# Patient Record
Sex: FEMALE | Race: WHITE | NOT HISPANIC OR LATINO | URBAN - METROPOLITAN AREA
[De-identification: names, ages, dates, MRNs, and addresses within clinical notes are randomized per-mention and may not be internally consistent; named-entity substitution may affect disease eponyms.]

---

## 2020-07-25 ENCOUNTER — TELEPHONE ENCOUNTER (OUTPATIENT)
Dept: URBAN - METROPOLITAN AREA CLINIC 13 | Facility: CLINIC | Age: 50
End: 2020-07-25

## 2020-07-25 RX ORDER — THYROID, PORCINE 90 MG/1
TAKE 1 TABLET DAILY TABLET ORAL
Refills: 0 | OUTPATIENT
End: 2015-08-19

## 2020-07-25 RX ORDER — LUBIPROSTONE 8 UG/1
TAKE 3 CAPSULE TWICE DAILY CAPSULE, GELATIN COATED ORAL
Refills: 0 | OUTPATIENT
Start: 2009-07-17 | End: 2009-08-11

## 2020-07-26 ENCOUNTER — TELEPHONE ENCOUNTER (OUTPATIENT)
Dept: URBAN - METROPOLITAN AREA CLINIC 13 | Facility: CLINIC | Age: 50
End: 2020-07-26

## 2020-07-26 RX ORDER — LEVOFLOXACIN 500 MG/1
TABLET, FILM COATED ORAL
Qty: 10 | Refills: 0 | Status: ACTIVE | COMMUNITY
Start: 2014-01-16

## 2020-07-26 RX ORDER — VALACYCLOVIR 1 G/1
TAKE 1 TABLET DAILY TABLET, FILM COATED ORAL
Refills: 0 | Status: ACTIVE | COMMUNITY
Start: 2016-04-12

## 2020-07-26 RX ORDER — IBUPROFEN 800 MG/1
TABLET ORAL
Qty: 30 | Refills: 0 | Status: ACTIVE | COMMUNITY
Start: 2016-09-20

## 2020-07-26 RX ORDER — AZITHROMYCIN DIHYDRATE 250 MG/1
TABLET, FILM COATED ORAL
Qty: 6 | Refills: 0 | Status: ACTIVE | COMMUNITY
Start: 2016-02-21

## 2020-07-26 RX ORDER — LEVOTHYROXINE SODIUM 25 UG/1
TABLET ORAL
Qty: 30 | Refills: 0 | Status: ACTIVE | COMMUNITY
Start: 2016-08-08

## 2020-07-26 RX ORDER — MODAFINIL 200 MG/1
TAKE 1 TABLET DAILY TABLET ORAL
Refills: 0 | Status: ACTIVE | COMMUNITY
Start: 2016-02-15

## 2020-07-26 RX ORDER — AMOXICILLIN 500 MG/1
TAKE 2 CAPSULES TWICE DAILY CAPSULE ORAL
Qty: 56 | Refills: 0 | Status: ACTIVE | COMMUNITY
Start: 2016-04-19

## 2020-07-26 RX ORDER — NITROFURANTOIN MONOHYDRATE/MACROCRYSTALLINE 25; 75 MG/1; MG/1
CAPSULE ORAL
Qty: 14 | Refills: 0 | Status: ACTIVE | COMMUNITY
Start: 2016-09-15

## 2020-07-26 RX ORDER — CLARITHROMYCIN 500 MG/1
TAKE 1 TABLET TWICE DAILY TABLET, FILM COATED ORAL
Qty: 10 | Refills: 0 | Status: ACTIVE | COMMUNITY
Start: 2016-04-19

## 2020-07-26 RX ORDER — LEVOTHYROXINE SODIUM 0.05 MG/1
TAKE 1 TABLET DAILY TABLET ORAL
Refills: 0 | Status: ACTIVE | COMMUNITY
Start: 2016-02-16

## 2020-07-26 RX ORDER — DIAZEPAM 5 MG/1
TABLET ORAL
Qty: 30 | Refills: 0 | Status: ACTIVE | COMMUNITY
Start: 2016-01-14

## 2020-07-26 RX ORDER — SERTRALINE 50 MG/1
TAKE 1 TABLET DAILY TABLET, FILM COATED ORAL
Qty: 30 | Refills: 1 | Status: ACTIVE | COMMUNITY
Start: 2016-04-18

## 2020-07-26 RX ORDER — DEXLANSOPRAZOLE 60 MG/1
TAKE 1 CAPSULE BY MOUTH EVERY DAY CAPSULE, DELAYED RELEASE ORAL
Qty: 90 | Refills: 3 | Status: ACTIVE | COMMUNITY
Start: 2016-04-12

## 2020-07-26 RX ORDER — ATORVASTATIN CALCIUM 40 MG/1
TABLET, FILM COATED ORAL
Qty: 30 | Refills: 0 | Status: ACTIVE | COMMUNITY
Start: 2016-09-01

## 2020-07-26 RX ORDER — METOCLOPRAMIDE 5 MG/1
TAKE 1 TABLET 3 TIMES DAILY PRN AS NEEDED TABLET ORAL
Qty: 60 | Refills: 1 | Status: ACTIVE | COMMUNITY
Start: 2016-05-26

## 2020-07-26 RX ORDER — GLATIRAMER ACETATE 20 MG/ML
INJECT 20 MG DAILY INJECTION, SOLUTION SUBCUTANEOUS
Refills: 0 | Status: ACTIVE | COMMUNITY
Start: 2016-03-16

## 2020-07-26 RX ORDER — LORATADINE 5 MG/5 ML
TAKE 1 TABLET DAILY AS DIRECTED SOLUTION, ORAL ORAL
Refills: 0 | Status: ACTIVE | COMMUNITY

## 2020-07-26 RX ORDER — IBUPROFEN 800 MG/1
TABLET ORAL
Qty: 30 | Refills: 0 | Status: ACTIVE | COMMUNITY
Start: 2016-04-04

## 2020-07-26 RX ORDER — LINACLOTIDE 145 UG/1
TAKE 1 CAPSULE BY MOUTH DAILY CAPSULE, GELATIN COATED ORAL
Qty: 90 | Refills: 3 | Status: ACTIVE | COMMUNITY
Start: 2016-07-12

## 2020-07-26 RX ORDER — AZITHROMYCIN DIHYDRATE 250 MG/1
TABLET, FILM COATED ORAL
Qty: 6 | Refills: 0 | Status: ACTIVE | COMMUNITY
Start: 2014-04-11

## 2020-07-26 RX ORDER — DEXLANSOPRAZOLE 60 MG/1
TAKE ONE CAPSULE EVERY MORNING BEFORE BREAKFAST CAPSULE, DELAYED RELEASE ORAL
Qty: 20 | Refills: 0 | Status: ACTIVE | COMMUNITY
Start: 2017-04-12

## 2020-07-26 RX ORDER — LEVOTHYROXINE SODIUM 25 UG/1
TABLET ORAL
Qty: 30 | Refills: 0 | Status: ACTIVE | COMMUNITY
Start: 2016-06-30

## 2023-02-08 ENCOUNTER — OFFICE VISIT (OUTPATIENT)
Dept: URBAN - METROPOLITAN AREA CLINIC 113 | Facility: CLINIC | Age: 53
End: 2023-02-08

## 2023-03-15 ENCOUNTER — OFFICE VISIT (OUTPATIENT)
Dept: URBAN - METROPOLITAN AREA CLINIC 113 | Facility: CLINIC | Age: 53
End: 2023-03-15

## 2024-07-02 ENCOUNTER — OFFICE VISIT (OUTPATIENT)
Dept: URBAN - METROPOLITAN AREA CLINIC 107 | Facility: CLINIC | Age: 54
End: 2024-07-02

## 2024-07-02 RX ORDER — LEVOTHYROXINE SODIUM 0.05 MG/1
TAKE 1 TABLET DAILY TABLET ORAL
Refills: 0 | Status: ACTIVE | COMMUNITY
Start: 2016-02-16

## 2024-07-02 RX ORDER — IBUPROFEN 800 MG/1
TABLET ORAL
Qty: 30 | Refills: 0 | Status: ACTIVE | COMMUNITY
Start: 2016-04-04

## 2024-07-02 RX ORDER — SERTRALINE 50 MG/1
TAKE 1 TABLET DAILY TABLET, FILM COATED ORAL
Qty: 30 | Refills: 1 | Status: ACTIVE | COMMUNITY
Start: 2016-04-18

## 2024-07-02 RX ORDER — LINACLOTIDE 145 UG/1
TAKE 1 CAPSULE BY MOUTH DAILY CAPSULE, GELATIN COATED ORAL
Qty: 90 | Refills: 3 | Status: ACTIVE | COMMUNITY
Start: 2016-07-12

## 2024-07-02 RX ORDER — LORATADINE 5 MG/5 ML
TAKE 1 TABLET DAILY AS DIRECTED SOLUTION, ORAL ORAL
Refills: 0 | Status: ACTIVE | COMMUNITY

## 2024-07-02 RX ORDER — DEXLANSOPRAZOLE 60 MG/1
TAKE 1 CAPSULE BY MOUTH EVERY DAY CAPSULE, DELAYED RELEASE ORAL
Qty: 90 | Refills: 3 | Status: ACTIVE | COMMUNITY
Start: 2016-04-12

## 2024-07-02 RX ORDER — AMOXICILLIN 500 MG/1
TAKE 2 CAPSULES TWICE DAILY CAPSULE ORAL
Qty: 56 | Refills: 0 | Status: ACTIVE | COMMUNITY
Start: 2016-04-19

## 2024-07-02 RX ORDER — VALACYCLOVIR 1 G/1
TAKE 1 TABLET DAILY TABLET, FILM COATED ORAL
Refills: 0 | Status: ACTIVE | COMMUNITY
Start: 2016-04-12

## 2024-07-02 RX ORDER — LEVOFLOXACIN 500 MG/1
TABLET, FILM COATED ORAL
Qty: 10 | Refills: 0 | Status: ACTIVE | COMMUNITY
Start: 2014-01-16

## 2024-07-02 RX ORDER — METOCLOPRAMIDE 5 MG/1
TAKE 1 TABLET 3 TIMES DAILY PRN AS NEEDED TABLET ORAL
Qty: 60 | Refills: 1 | Status: ACTIVE | COMMUNITY
Start: 2016-05-26

## 2024-07-02 RX ORDER — LEVOTHYROXINE SODIUM 25 UG/1
TABLET ORAL
Qty: 30 | Refills: 0 | Status: ACTIVE | COMMUNITY
Start: 2016-06-30

## 2024-07-02 RX ORDER — CLARITHROMYCIN 500 MG/1
TAKE 1 TABLET TWICE DAILY TABLET, FILM COATED ORAL
Qty: 10 | Refills: 0 | Status: ACTIVE | COMMUNITY
Start: 2016-04-19

## 2024-07-02 RX ORDER — ATORVASTATIN CALCIUM 40 MG/1
TABLET, FILM COATED ORAL
Qty: 30 | Refills: 0 | Status: ACTIVE | COMMUNITY
Start: 2016-09-01

## 2024-07-02 RX ORDER — MODAFINIL 200 MG/1
TAKE 1 TABLET DAILY TABLET ORAL
Refills: 0 | Status: ACTIVE | COMMUNITY
Start: 2016-02-15

## 2024-07-02 RX ORDER — NITROFURANTOIN MONOHYDRATE/MACROCRYSTALLINE 25; 75 MG/1; MG/1
CAPSULE ORAL
Qty: 14 | Refills: 0 | Status: ACTIVE | COMMUNITY
Start: 2016-09-15

## 2024-07-02 RX ORDER — GLATIRAMER ACETATE 20 MG/ML
INJECT 20 MG DAILY INJECTION, SOLUTION SUBCUTANEOUS
Refills: 0 | Status: ACTIVE | COMMUNITY
Start: 2016-03-16

## 2024-07-02 RX ORDER — DIAZEPAM 5 MG/1
TABLET ORAL
Qty: 30 | Refills: 0 | Status: ACTIVE | COMMUNITY
Start: 2016-01-14

## 2024-07-02 RX ORDER — AZITHROMYCIN DIHYDRATE 250 MG/1
TABLET, FILM COATED ORAL
Qty: 6 | Refills: 0 | Status: ACTIVE | COMMUNITY
Start: 2014-04-11

## 2024-07-08 ENCOUNTER — DASHBOARD ENCOUNTERS (OUTPATIENT)
Age: 54
End: 2024-07-08

## 2024-07-08 ENCOUNTER — OFFICE VISIT (OUTPATIENT)
Dept: URBAN - METROPOLITAN AREA CLINIC 107 | Facility: CLINIC | Age: 54
End: 2024-07-08
Payer: COMMERCIAL

## 2024-07-08 ENCOUNTER — LAB OUTSIDE AN ENCOUNTER (OUTPATIENT)
Dept: URBAN - METROPOLITAN AREA CLINIC 107 | Facility: CLINIC | Age: 54
End: 2024-07-08

## 2024-07-08 VITALS
OXYGEN SATURATION: 98 % | RESPIRATION RATE: 18 BRPM | DIASTOLIC BLOOD PRESSURE: 93 MMHG | HEIGHT: 66 IN | SYSTOLIC BLOOD PRESSURE: 139 MMHG | TEMPERATURE: 98.2 F | WEIGHT: 181.4 LBS | HEART RATE: 93 BPM | BODY MASS INDEX: 29.15 KG/M2

## 2024-07-08 DIAGNOSIS — K59.09 OTHER CONSTIPATION: ICD-10-CM

## 2024-07-08 DIAGNOSIS — R14.0 ABDOMINAL BLOATING: ICD-10-CM

## 2024-07-08 DIAGNOSIS — R11.0 CHRONIC NAUSEA: ICD-10-CM

## 2024-07-08 DIAGNOSIS — R63.0 DECREASED APPETITE: ICD-10-CM

## 2024-07-08 DIAGNOSIS — R10.84 GENERALIZED ABDOMINAL PAIN: ICD-10-CM

## 2024-07-08 PROBLEM — 64379006: Status: ACTIVE | Noted: 2024-07-08

## 2024-07-08 PROCEDURE — 99204 OFFICE O/P NEW MOD 45 MIN: CPT | Performed by: NURSE PRACTITIONER

## 2024-07-08 RX ORDER — VALACYCLOVIR 1 G/1
TAKE 1 TABLET DAILY TABLET, FILM COATED ORAL
Refills: 0 | Status: ON HOLD | COMMUNITY
Start: 2016-04-12

## 2024-07-08 RX ORDER — POLYETHYLENE GLYCOL 3350, SODIUM CHLORIDE, SODIUM BICARBONATE, POTASSIUM CHLORIDE 420; 11.2; 5.72; 1.48 G/4L; G/4L; G/4L; G/4L
4000 ML POWDER, FOR SOLUTION ORAL ONCE
Qty: 4000 ML | Refills: 0 | OUTPATIENT
Start: 2024-07-08 | End: 2024-07-09

## 2024-07-08 RX ORDER — LEVOFLOXACIN 500 MG/1
TABLET, FILM COATED ORAL
Qty: 10 | Refills: 0 | Status: ON HOLD | COMMUNITY
Start: 2014-01-16

## 2024-07-08 RX ORDER — SERTRALINE 50 MG/1
TAKE 1 TABLET DAILY TABLET, FILM COATED ORAL
Qty: 30 | Refills: 1 | Status: ON HOLD | COMMUNITY
Start: 2016-04-18

## 2024-07-08 RX ORDER — LISINOPRIL 5 MG/1
1 TABLET TABLET ORAL ONCE A DAY
Status: ACTIVE | COMMUNITY

## 2024-07-08 RX ORDER — LORATADINE 5 MG/5 ML
TAKE 1 TABLET DAILY AS DIRECTED SOLUTION, ORAL ORAL
Refills: 0 | Status: ON HOLD | COMMUNITY

## 2024-07-08 RX ORDER — AMLODIPINE BESYLATE 5 MG/1
1 TABLET TABLET ORAL ONCE A DAY
Status: ACTIVE | COMMUNITY

## 2024-07-08 RX ORDER — CLARITHROMYCIN 500 MG/1
TAKE 1 TABLET TWICE DAILY TABLET, FILM COATED ORAL
Qty: 10 | Refills: 0 | Status: ON HOLD | COMMUNITY
Start: 2016-04-19

## 2024-07-08 RX ORDER — DEXLANSOPRAZOLE 60 MG/1
TAKE 1 CAPSULE BY MOUTH EVERY DAY CAPSULE, DELAYED RELEASE ORAL
Qty: 90 | Refills: 3 | Status: ON HOLD | COMMUNITY
Start: 2016-04-12

## 2024-07-08 RX ORDER — DIAZEPAM 5 MG/1
TABLET ORAL
Qty: 30 | Refills: 0 | Status: ON HOLD | COMMUNITY
Start: 2016-01-14

## 2024-07-08 RX ORDER — LINACLOTIDE 145 UG/1
TAKE 1 CAPSULE BY MOUTH DAILY CAPSULE, GELATIN COATED ORAL
Qty: 90 | Refills: 3 | Status: ACTIVE | COMMUNITY
Start: 2016-07-12

## 2024-07-08 RX ORDER — NITROFURANTOIN MONOHYDRATE/MACROCRYSTALLINE 25; 75 MG/1; MG/1
CAPSULE ORAL
Qty: 14 | Refills: 0 | Status: ON HOLD | COMMUNITY
Start: 2016-09-15

## 2024-07-08 RX ORDER — AMOXICILLIN 500 MG/1
TAKE 2 CAPSULES TWICE DAILY CAPSULE ORAL
Qty: 56 | Refills: 0 | Status: ON HOLD | COMMUNITY
Start: 2016-04-19

## 2024-07-08 RX ORDER — ATORVASTATIN CALCIUM 40 MG/1
TABLET, FILM COATED ORAL
Qty: 30 | Refills: 0 | Status: ON HOLD | COMMUNITY
Start: 2016-09-01

## 2024-07-08 RX ORDER — IBUPROFEN 800 MG/1
TABLET ORAL
Qty: 30 | Refills: 0 | Status: ON HOLD | COMMUNITY
Start: 2016-04-04

## 2024-07-08 RX ORDER — OMEPRAZOLE 20 MG/1
1 TABLET 30 MINUTES BEFORE MORNING MEAL TABLET, DELAYED RELEASE ORAL ONCE A DAY
Status: ACTIVE | COMMUNITY

## 2024-07-08 RX ORDER — LEVOTHYROXINE SODIUM 0.05 MG/1
TAKE 1 TABLET DAILY TABLET ORAL
Refills: 0 | Status: ON HOLD | COMMUNITY
Start: 2016-02-16

## 2024-07-08 RX ORDER — GLATIRAMER ACETATE 20 MG/ML
INJECT 20 MG DAILY INJECTION, SOLUTION SUBCUTANEOUS
Refills: 0 | Status: ON HOLD | COMMUNITY
Start: 2016-03-16

## 2024-07-08 RX ORDER — LINACLOTIDE 290 UG/1
1 CAPSULE AT LEAST 30 MINUTES BEFORE THE FIRST MEAL OF THE DAY ON AN EMPTY STOMACH CAPSULE, GELATIN COATED ORAL ONCE A DAY
Qty: 90 | Refills: 3 | OUTPATIENT
Start: 2024-07-08 | End: 2025-07-03

## 2024-07-08 RX ORDER — MODAFINIL 200 MG/1
TAKE 1 TABLET DAILY TABLET ORAL
Refills: 0 | Status: ON HOLD | COMMUNITY
Start: 2016-02-15

## 2024-07-08 RX ORDER — LEVOTHYROXINE SODIUM 25 UG/1
TABLET ORAL
Qty: 30 | Refills: 0 | Status: ON HOLD | COMMUNITY
Start: 2016-06-30

## 2024-07-08 RX ORDER — METOCLOPRAMIDE 5 MG/1
TAKE 1 TABLET 3 TIMES DAILY PRN AS NEEDED TABLET ORAL
Qty: 60 | Refills: 1 | Status: ON HOLD | COMMUNITY
Start: 2016-05-26

## 2024-07-08 RX ORDER — AZITHROMYCIN DIHYDRATE 250 MG/1
TABLET, FILM COATED ORAL
Qty: 6 | Refills: 0 | Status: ON HOLD | COMMUNITY
Start: 2014-04-11

## 2024-07-08 NOTE — HPI-TODAY'S VISIT:
53-year-old woman presenting for evaluation of abdominal pain, bloating and constipation.    Recent notes from Dr. Nathan Small are reviewed.  She has past medical history of hypothyroidism, autoimmune thyroiditis, vitamin D deficiency, mixed hyperlipidemia, generalized anxiety disorder, nicotine dependence, chronic depression, multiple sclerosis, migraines, chronic constipation. She is on Linzess 145 mcg daily.  She presents today with abdominal pain in the generlized abdomen, with back and hip pain. She has significant constipation. She estimates having bowel movements once every 5 to 7 days. Stools are small in volume, and pencil thin. She is having frequent candidal infections. She has been on Diflucan and magic mouthwash for a month. She has significant bloating. She has been seen in the ED multiple times. She brings with her some records from [place], dated June 4, 2024. She had normal labs, She had a CT scan showing a mild ileus pattern or gastrointestinal stasis. There is hepatomegaly, hepatic steatosis, and a 17 mm right lobe hepatic hemangioma. She has significant bloating. She has significant abdominal pain. Her oral intake is decreaing. There is heartburn, worsening over the last few days. She has chronic nausea. She does have some regurgitation at times. She is taking omeptazole 20 mg daily. There is no trouble with swallowing.   She also has MS. She did have a normal gastric emptying study several years ago. She has been lost to follow up since that time. She has followed with [facility], and reports a colonoscopy within the last year.

## 2024-07-08 NOTE — PHYSICAL EXAM NECK/THYROID:
normal appearance Instructions (Optional): Referred to Dr. Waggoner for additional treatment options Size Of Lesion In Cm (Optional): 0 Introduction Text (Please End With A Colon): The following procedure was deferred: Detail Level: Zone

## 2024-07-15 ENCOUNTER — TELEPHONE ENCOUNTER (OUTPATIENT)
Dept: URBAN - METROPOLITAN AREA CLINIC 107 | Facility: CLINIC | Age: 54
End: 2024-07-15

## 2024-07-16 ENCOUNTER — LAB OUTSIDE AN ENCOUNTER (OUTPATIENT)
Dept: URBAN - METROPOLITAN AREA CLINIC 113 | Facility: CLINIC | Age: 54
End: 2024-07-16

## 2024-07-16 ENCOUNTER — TELEPHONE ENCOUNTER (OUTPATIENT)
Dept: URBAN - METROPOLITAN AREA CLINIC 113 | Facility: CLINIC | Age: 54
End: 2024-07-16

## 2024-07-16 PROBLEM — 300331000: Status: ACTIVE | Noted: 2024-07-16

## 2024-07-19 ENCOUNTER — TELEPHONE ENCOUNTER (OUTPATIENT)
Dept: URBAN - METROPOLITAN AREA CLINIC 113 | Facility: CLINIC | Age: 54
End: 2024-07-19

## 2024-07-20 LAB
A/G RATIO: 2
ALBUMIN: 4.7
ALKALINE PHOSPHATASE: 90
ALT (SGPT): 159
AST (SGOT): 98
BILIRUBIN, TOTAL: 0.6
BUN/CREATININE RATIO: 4
BUN: 3
CALCIUM: 9.9
CARBON DIOXIDE, TOTAL: 25
CHLORIDE: 103
CREATININE: 0.67
EGFR: 105
GLOBULIN, TOTAL: 2.4
GLUCOSE: 85
HEMATOCRIT: 44.8
HEMOGLOBIN: 15.7
MCH: 31.3
MCHC: 35
MCV: 89.4
MPV: 11
PLATELET COUNT: 268
POTASSIUM: 4.2
PROTEIN, TOTAL: 7.1
RDW: 13.2
RED BLOOD CELL COUNT: 5.01
SODIUM: 137
WHITE BLOOD CELL COUNT: 10.1

## 2024-08-07 ENCOUNTER — OFFICE VISIT (OUTPATIENT)
Dept: URBAN - METROPOLITAN AREA CLINIC 113 | Facility: CLINIC | Age: 54
End: 2024-08-07

## 2024-08-08 ENCOUNTER — LAB OUTSIDE AN ENCOUNTER (OUTPATIENT)
Dept: URBAN - METROPOLITAN AREA CLINIC 113 | Facility: CLINIC | Age: 54
End: 2024-08-08

## 2024-08-08 ENCOUNTER — OFFICE VISIT (OUTPATIENT)
Dept: URBAN - METROPOLITAN AREA CLINIC 113 | Facility: CLINIC | Age: 54
End: 2024-08-08
Payer: COMMERCIAL

## 2024-08-08 VITALS
BODY MASS INDEX: 28.19 KG/M2 | HEIGHT: 66 IN | SYSTOLIC BLOOD PRESSURE: 117 MMHG | HEART RATE: 108 BPM | TEMPERATURE: 97.3 F | RESPIRATION RATE: 20 BRPM | WEIGHT: 175.4 LBS | DIASTOLIC BLOOD PRESSURE: 93 MMHG

## 2024-08-08 DIAGNOSIS — R79.89 ELEVATED LFTS: ICD-10-CM

## 2024-08-08 DIAGNOSIS — K76.9 LIVER LESION: ICD-10-CM

## 2024-08-08 DIAGNOSIS — R14.0 ABDOMINAL BLOATING: ICD-10-CM

## 2024-08-08 DIAGNOSIS — K59.09 OTHER CONSTIPATION: ICD-10-CM

## 2024-08-08 PROCEDURE — 99214 OFFICE O/P EST MOD 30 MIN: CPT | Performed by: NURSE PRACTITIONER

## 2024-08-08 RX ORDER — LINACLOTIDE 145 UG/1
TAKE 1 CAPSULE BY MOUTH DAILY CAPSULE, GELATIN COATED ORAL
Qty: 90 | Refills: 3 | Status: ON HOLD | COMMUNITY
Start: 2016-07-12

## 2024-08-08 RX ORDER — VALACYCLOVIR 1 G/1
TAKE 1 TABLET DAILY TABLET, FILM COATED ORAL
Refills: 0 | Status: ON HOLD | COMMUNITY
Start: 2016-04-12

## 2024-08-08 RX ORDER — IBUPROFEN 800 MG/1
TABLET ORAL
Qty: 30 | Refills: 0 | Status: ON HOLD | COMMUNITY
Start: 2016-04-04

## 2024-08-08 RX ORDER — AZITHROMYCIN DIHYDRATE 250 MG/1
TABLET, FILM COATED ORAL
Qty: 6 | Refills: 0 | Status: ON HOLD | COMMUNITY
Start: 2014-04-11

## 2024-08-08 RX ORDER — SERTRALINE 50 MG/1
TAKE 1 TABLET DAILY TABLET, FILM COATED ORAL
Qty: 30 | Refills: 1 | Status: ON HOLD | COMMUNITY
Start: 2016-04-18

## 2024-08-08 RX ORDER — METOCLOPRAMIDE 5 MG/1
TAKE 1 TABLET 3 TIMES DAILY PRN AS NEEDED TABLET ORAL
Qty: 60 | Refills: 1 | Status: ON HOLD | COMMUNITY
Start: 2016-05-26

## 2024-08-08 RX ORDER — AMLODIPINE BESYLATE 5 MG/1
1 TABLET TABLET ORAL ONCE A DAY
Status: ACTIVE | COMMUNITY

## 2024-08-08 RX ORDER — LISINOPRIL 5 MG/1
1 TABLET TABLET ORAL ONCE A DAY
Status: ACTIVE | COMMUNITY

## 2024-08-08 RX ORDER — LINACLOTIDE 290 UG/1
1 CAPSULE AT LEAST 30 MINUTES BEFORE THE FIRST MEAL OF THE DAY ON AN EMPTY STOMACH CAPSULE, GELATIN COATED ORAL ONCE A DAY
Qty: 90 | Refills: 3 | Status: ACTIVE | COMMUNITY
Start: 2024-07-08 | End: 2025-07-03

## 2024-08-08 RX ORDER — CLARITHROMYCIN 500 MG/1
TAKE 1 TABLET TWICE DAILY TABLET, FILM COATED ORAL
Qty: 10 | Refills: 0 | Status: ON HOLD | COMMUNITY
Start: 2016-04-19

## 2024-08-08 RX ORDER — DEXLANSOPRAZOLE 60 MG/1
TAKE 1 CAPSULE BY MOUTH EVERY DAY CAPSULE, DELAYED RELEASE ORAL
Qty: 90 | Refills: 3 | Status: ON HOLD | COMMUNITY
Start: 2016-04-12

## 2024-08-08 RX ORDER — NITROFURANTOIN MONOHYDRATE/MACROCRYSTALLINE 25; 75 MG/1; MG/1
CAPSULE ORAL
Qty: 14 | Refills: 0 | Status: ON HOLD | COMMUNITY
Start: 2016-09-15

## 2024-08-08 RX ORDER — LINACLOTIDE 290 UG/1
1 CAPSULE AT LEAST 30 MINUTES BEFORE THE FIRST MEAL OF THE DAY ON AN EMPTY STOMACH CAPSULE, GELATIN COATED ORAL ONCE A DAY
OUTPATIENT
Start: 2024-07-08

## 2024-08-08 RX ORDER — MODAFINIL 200 MG/1
TAKE 1 TABLET DAILY TABLET ORAL
Refills: 0 | Status: ON HOLD | COMMUNITY
Start: 2016-02-15

## 2024-08-08 RX ORDER — OMEPRAZOLE 20 MG/1
1 TABLET 30 MINUTES BEFORE MORNING MEAL TABLET, DELAYED RELEASE ORAL ONCE A DAY
Status: ACTIVE | COMMUNITY

## 2024-08-08 RX ORDER — LEVOFLOXACIN 500 MG/1
TABLET, FILM COATED ORAL
Qty: 10 | Refills: 0 | Status: ON HOLD | COMMUNITY
Start: 2014-01-16

## 2024-08-08 RX ORDER — LEVOTHYROXINE SODIUM 25 UG/1
TABLET ORAL
Qty: 30 | Refills: 0 | Status: ON HOLD | COMMUNITY
Start: 2016-06-30

## 2024-08-08 RX ORDER — LEVOTHYROXINE SODIUM 0.05 MG/1
TAKE 1 TABLET DAILY TABLET ORAL
Refills: 0 | Status: ON HOLD | COMMUNITY
Start: 2016-02-16

## 2024-08-08 RX ORDER — GLATIRAMER ACETATE 20 MG/ML
INJECT 20 MG DAILY INJECTION, SOLUTION SUBCUTANEOUS
Refills: 0 | Status: ON HOLD | COMMUNITY
Start: 2016-03-16

## 2024-08-08 RX ORDER — AMOXICILLIN 500 MG/1
TAKE 2 CAPSULES TWICE DAILY CAPSULE ORAL
Qty: 56 | Refills: 0 | Status: ON HOLD | COMMUNITY
Start: 2016-04-19

## 2024-08-08 RX ORDER — ATORVASTATIN CALCIUM 40 MG/1
TABLET, FILM COATED ORAL
Qty: 30 | Refills: 0 | Status: ON HOLD | COMMUNITY
Start: 2016-09-01

## 2024-08-08 RX ORDER — DIAZEPAM 5 MG/1
TABLET ORAL
Qty: 30 | Refills: 0 | Status: ON HOLD | COMMUNITY
Start: 2016-01-14

## 2024-08-08 RX ORDER — LORATADINE 5 MG/5 ML
TAKE 1 TABLET DAILY AS DIRECTED SOLUTION, ORAL ORAL
Refills: 0 | Status: ON HOLD | COMMUNITY

## 2024-08-08 NOTE — HPI-TODAY'S VISIT:
53-year-old woman presenting for evaluation of abdominal pain, bloating and constipation.    Recent notes from Dr. Nahtan Small are reviewed.  She has past medical history of hypothyroidism, autoimmune thyroiditis, vitamin D deficiency, mixed hyperlipidemia, generalized anxiety disorder, nicotine dependence, chronic depression, multiple sclerosis, migraines, chronic constipation. She is on Linzess 145 mcg daily.  She presents today with abdominal pain in the generlized abdomen, with back and hip pain. She has significant constipation. She estimates having bowel movements once every 5 to 7 days. Stools are small in volume, and pencil thin. She is having frequent candidal infections. She has been on Diflucan and magic mouthwash for a month. She has significant bloating. She has been seen in the ED multiple times. She brings with her some records from [place], dated June 4, 2024. She had normal labs, She had a CT scan showing a mild ileus pattern or gastrointestinal stasis. There is hepatomegaly, hepatic steatosis, and a 17 mm right lobe hepatic hemangioma. She has significant bloating. She has significant abdominal pain. Her oral intake is decreaing. There is heartburn, worsening over the last few days. She has chronic nausea. She does have some regurgitation at times. She is taking omeptazole 20 mg daily. There is no trouble with swallowing.   She also has MS. She did have a normal gastric emptying study several years ago. She has been lost to follow up since that time. She has followed with [facility], and reports a colonoscopy within the last year.  CT a/p 7/12/24: No acute intra-abdominal pathology. Marked hepatic steatosis with a 17 mm indeterminate enhancing lesions within the right posterior right hepatic lobe. A dedicated MRI liver protocol with and without contrast was recommended.  Labs 7/8/24: BUN 3, Crt 0.67, Na 137, K 4.2, Tbili 0.6, ALP 90, AST 98, . WBC 10.1, Hg 15.7, MCV 89.4, Plt 268.  She is scheduled for the MRI test tomorrow, which is planned to evaluate the 17 mm lesion. She continues with constipation predominant bowel habits with a rock-like sensation in the top of her abdomen. This discomfort can radiate to the left side of her abdomen. She feels very bloated in the abdomen, and finds her appearance abnormal since her extremities are getting smaller but her abdomen is becoming more distended. She has watery stools daily, though volume is decreasing, in the setting of Linzess 290 mcg daily. She did take Sennokot, which resulted in passing brown stools rather than tan, watery like stools. Her weight is stable.

## 2024-08-18 LAB
% SATURATION: 10
A/G RATIO: 1.6
ACTIN (SMOOTH MUSCLE) ANTIBODY (IGG): <20
ALBUMIN: 4.6
ALKALINE PHOSPHATASE: 100
ALPHA-1-ANTITRYPSIN (AAT) PHENOTYPE: (no result)
ALPHA-1-ANTITRYPSIN QN: 159
ALT (SGPT): 102
ANA SCREEN, IFA: NEGATIVE
AST (SGOT): 75
BILIRUBIN, TOTAL: 0.5
BUN/CREATININE RATIO: 5
BUN: 3
CALCIUM: 9.9
CARBON DIOXIDE, TOTAL: 22
CERULOPLASMIN: 39
CHLORIDE: 103
CREATININE: 0.6
EGFR: 108
FERRITIN: 55
GGT: 98
GLOBULIN, TOTAL: 2.8
GLUCOSE: 77
HEMATOCRIT: 52
HEMOGLOBIN: 16.6
HEPATITIS A AB, TOTAL: (no result)
HEPATITIS B CORE AB TOTAL: (no result)
HEPATITIS B SURFACE AB IMMUNITY, QN: <5
HEPATITIS B SURFACE ANTIGEN: (no result)
HEPATITIS C ANTIBODY: (no result)
IMMUNOGLOBULIN A: 407
IMMUNOGLOBULIN A: 407
IMMUNOGLOBULIN G: 787
IMMUNOGLOBULIN M: 224
INR: 1
INTERPRETATION: (no result)
IRON BINDING CAPACITY: 389
IRON, TOTAL: 38
LKM-1 ANTIBODY (IGG): <=20
MCH: 29.7
MCHC: 31.9
MCV: 93.2
MITOCHONDRIAL (M2) ANTIBODY: <=20
MPV: 11.3
PLATELET COUNT: 288
POTASSIUM: 4.3
PROTEIN, TOTAL: 7.4
PT: 10.9
RDW: 13.6
RED BLOOD CELL COUNT: 5.58
SODIUM: 138
THYROGLOBULIN ANTIBODIES: 23
TISSUE TRANSGLUTAMINASE AB, IGA: <1
TSH W/REFLEX TO FT4: 2.4
WHITE BLOOD CELL COUNT: 11.6

## 2024-08-23 ENCOUNTER — TELEPHONE ENCOUNTER (OUTPATIENT)
Dept: URBAN - METROPOLITAN AREA CLINIC 113 | Facility: CLINIC | Age: 54
End: 2024-08-23

## 2024-11-08 ENCOUNTER — LAB OUTSIDE AN ENCOUNTER (OUTPATIENT)
Dept: URBAN - METROPOLITAN AREA CLINIC 113 | Facility: CLINIC | Age: 54
End: 2024-11-08

## 2024-11-08 ENCOUNTER — OFFICE VISIT (OUTPATIENT)
Dept: URBAN - METROPOLITAN AREA CLINIC 113 | Facility: CLINIC | Age: 54
End: 2024-11-08
Payer: COMMERCIAL

## 2024-11-08 VITALS
BODY MASS INDEX: 27.35 KG/M2 | RESPIRATION RATE: 18 BRPM | WEIGHT: 170.2 LBS | HEART RATE: 94 BPM | TEMPERATURE: 98.1 F | DIASTOLIC BLOOD PRESSURE: 101 MMHG | HEIGHT: 66 IN | SYSTOLIC BLOOD PRESSURE: 132 MMHG

## 2024-11-08 DIAGNOSIS — R14.0 ABDOMINAL BLOATING: ICD-10-CM

## 2024-11-08 DIAGNOSIS — K59.09 OTHER CONSTIPATION: ICD-10-CM

## 2024-11-08 DIAGNOSIS — K76.9 LIVER LESION: ICD-10-CM

## 2024-11-08 PROCEDURE — 99214 OFFICE O/P EST MOD 30 MIN: CPT | Performed by: NURSE PRACTITIONER

## 2024-11-08 RX ORDER — LINACLOTIDE 290 UG/1
1 CAPSULE AT LEAST 30 MINUTES BEFORE THE FIRST MEAL OF THE DAY ON AN EMPTY STOMACH CAPSULE, GELATIN COATED ORAL ONCE A DAY
Status: ACTIVE | COMMUNITY
Start: 2024-07-08

## 2024-11-08 RX ORDER — NITROFURANTOIN MONOHYDRATE/MACROCRYSTALLINE 25; 75 MG/1; MG/1
CAPSULE ORAL
Qty: 14 | Refills: 0 | Status: ON HOLD | COMMUNITY
Start: 2016-09-15

## 2024-11-08 RX ORDER — GLATIRAMER ACETATE 20 MG/ML
INJECT 20 MG DAILY INJECTION, SOLUTION SUBCUTANEOUS
Refills: 0 | Status: ON HOLD | COMMUNITY
Start: 2016-03-16

## 2024-11-08 RX ORDER — METOCLOPRAMIDE 5 MG/1
TAKE 1 TABLET 3 TIMES DAILY PRN AS NEEDED TABLET ORAL
Qty: 60 | Refills: 1 | Status: ON HOLD | COMMUNITY
Start: 2016-05-26

## 2024-11-08 RX ORDER — SERTRALINE 50 MG/1
TAKE 1 TABLET DAILY TABLET, FILM COATED ORAL
Qty: 30 | Refills: 1 | Status: ON HOLD | COMMUNITY
Start: 2016-04-18

## 2024-11-08 RX ORDER — VALACYCLOVIR 1 G/1
TAKE 1 TABLET DAILY TABLET, FILM COATED ORAL
Refills: 0 | Status: ON HOLD | COMMUNITY
Start: 2016-04-12

## 2024-11-08 RX ORDER — LORATADINE 5 MG/5 ML
TAKE 1 TABLET DAILY AS DIRECTED SOLUTION, ORAL ORAL
Refills: 0 | Status: ON HOLD | COMMUNITY

## 2024-11-08 RX ORDER — LEVOTHYROXINE SODIUM 0.05 MG/1
TAKE 1 TABLET DAILY TABLET ORAL
Refills: 0 | Status: ON HOLD | COMMUNITY
Start: 2016-02-16

## 2024-11-08 RX ORDER — AMLODIPINE BESYLATE 5 MG/1
1 TABLET TABLET ORAL ONCE A DAY
Status: ACTIVE | COMMUNITY

## 2024-11-08 RX ORDER — FLUCONAZOLE 100 MG/1
1 TABLET TABLET ORAL
Status: ACTIVE | COMMUNITY

## 2024-11-08 RX ORDER — LISINOPRIL 5 MG/1
1 TABLET TABLET ORAL ONCE A DAY
Status: ON HOLD | COMMUNITY

## 2024-11-08 RX ORDER — LEVOFLOXACIN 500 MG/1
TABLET, FILM COATED ORAL
Qty: 10 | Refills: 0 | Status: ON HOLD | COMMUNITY
Start: 2014-01-16

## 2024-11-08 RX ORDER — AZITHROMYCIN DIHYDRATE 250 MG/1
TABLET, FILM COATED ORAL
Qty: 6 | Refills: 0 | Status: ON HOLD | COMMUNITY
Start: 2014-04-11

## 2024-11-08 RX ORDER — LEVOTHYROXINE SODIUM 25 UG/1
TABLET ORAL
Qty: 30 | Refills: 0 | Status: ON HOLD | COMMUNITY
Start: 2016-06-30

## 2024-11-08 RX ORDER — DIAZEPAM 5 MG/1
TABLET ORAL
Qty: 30 | Refills: 0 | Status: ON HOLD | COMMUNITY
Start: 2016-01-14

## 2024-11-08 RX ORDER — ATORVASTATIN CALCIUM 40 MG/1
TABLET, FILM COATED ORAL
Qty: 30 | Refills: 0 | Status: ON HOLD | COMMUNITY
Start: 2016-09-01

## 2024-11-08 RX ORDER — LINACLOTIDE 145 UG/1
TAKE 1 CAPSULE BY MOUTH DAILY CAPSULE, GELATIN COATED ORAL
Qty: 90 | Refills: 3 | Status: ON HOLD | COMMUNITY
Start: 2016-07-12

## 2024-11-08 RX ORDER — AMOXICILLIN 500 MG/1
TAKE 2 CAPSULES TWICE DAILY CAPSULE ORAL
Qty: 56 | Refills: 0 | Status: ON HOLD | COMMUNITY
Start: 2016-04-19

## 2024-11-08 RX ORDER — MODAFINIL 200 MG/1
TAKE 1 TABLET DAILY TABLET ORAL
Refills: 0 | Status: ON HOLD | COMMUNITY
Start: 2016-02-15

## 2024-11-08 RX ORDER — IBUPROFEN 800 MG/1
TABLET ORAL
Qty: 30 | Refills: 0 | Status: ON HOLD | COMMUNITY
Start: 2016-04-04

## 2024-11-08 RX ORDER — DEXLANSOPRAZOLE 60 MG/1
TAKE 1 CAPSULE BY MOUTH EVERY DAY CAPSULE, DELAYED RELEASE ORAL
Qty: 90 | Refills: 3 | Status: ON HOLD | COMMUNITY
Start: 2016-04-12

## 2024-11-08 RX ORDER — OMEPRAZOLE 20 MG/1
1 TABLET 30 MINUTES BEFORE MORNING MEAL TABLET, DELAYED RELEASE ORAL ONCE A DAY
Status: ACTIVE | COMMUNITY

## 2024-11-08 RX ORDER — CLARITHROMYCIN 500 MG/1
TAKE 1 TABLET TWICE DAILY TABLET, FILM COATED ORAL
Qty: 10 | Refills: 0 | Status: ON HOLD | COMMUNITY
Start: 2016-04-19

## 2024-11-08 NOTE — HPI-TODAY'S VISIT:
53-year-old woman with abdominal pain, bloating and constipation with recent ER visit in June 2024 notable for hepatomegaly, hepatic steatosis, and a 17 mm right lobe hepatic hemangioma.  She was seen in the office in August 2024.  She was recommended Linzess 290 mcg daily with use of senna 1 tablet each day.  Regarding the 17 mm hepatic hemangioma in the right hepatic lobe, she was already planned for an MRI to further characterize this lesion.  She was asked to keep this appointment.  Regarding her elevated liver function tests, she was recommended viral, autoimmune and hereditary markers. Labs 8/8/2024 revealed elevated IgA and GGT.  She was noted to have elevated hemoglobin though ferritin and iron saturation were both normal or low.  Autoimmune, viral and hereditary screens were otherwise negative for source of elevated liver function tests. MRI of the abdomen with and without contrast 8/9/2024 was notable for 2 right hepatic lobe lesions measuring 6 mm and the larger 2 cm demonstrate benign pre and post enhancement characteristics and are most consistent with either portal venous shunts or atypical hemangiomas.  A repeat MRI was recommended to be considered in 6 months to ensure stability.  Otherwise negative MRI of the abdomen.  She continues with abdominal bloating and constipation. She gets early satiety. She saw a specialist at [facility]. She was recommended treatment for SIBO of SIFO. She is awaiting starting Xifaxan. She is currently being treated for fungal overgrowth. She thinks her bowels are moving better. The [facility] doctor wants her to have a MRI defecography, which she has been having difficulty getting scheduled.

## 2024-12-16 ENCOUNTER — TELEPHONE ENCOUNTER (OUTPATIENT)
Dept: URBAN - METROPOLITAN AREA CLINIC 113 | Facility: CLINIC | Age: 54
End: 2024-12-16

## 2024-12-16 ENCOUNTER — LAB OUTSIDE AN ENCOUNTER (OUTPATIENT)
Dept: URBAN - METROPOLITAN AREA CLINIC 113 | Facility: CLINIC | Age: 54
End: 2024-12-16

## 2025-02-03 ENCOUNTER — TELEPHONE ENCOUNTER (OUTPATIENT)
Dept: URBAN - METROPOLITAN AREA CLINIC 113 | Facility: CLINIC | Age: 55
End: 2025-02-03

## 2025-02-12 ENCOUNTER — OFFICE VISIT (OUTPATIENT)
Dept: URBAN - METROPOLITAN AREA CLINIC 113 | Facility: CLINIC | Age: 55
End: 2025-02-12
Payer: COMMERCIAL

## 2025-02-12 VITALS
HEART RATE: 76 BPM | SYSTOLIC BLOOD PRESSURE: 117 MMHG | RESPIRATION RATE: 20 BRPM | WEIGHT: 166.6 LBS | BODY MASS INDEX: 26.78 KG/M2 | DIASTOLIC BLOOD PRESSURE: 85 MMHG | HEIGHT: 66 IN | TEMPERATURE: 98.6 F

## 2025-02-12 DIAGNOSIS — R14.0 ABDOMINAL BLOATING: ICD-10-CM

## 2025-02-12 DIAGNOSIS — K59.09 OTHER CONSTIPATION: ICD-10-CM

## 2025-02-12 DIAGNOSIS — K76.9 LIVER LESION: ICD-10-CM

## 2025-02-12 DIAGNOSIS — R10.84 GENERALIZED ABDOMINAL PAIN: ICD-10-CM

## 2025-02-12 PROCEDURE — 99213 OFFICE O/P EST LOW 20 MIN: CPT | Performed by: NURSE PRACTITIONER

## 2025-02-12 PROCEDURE — 99214 OFFICE O/P EST MOD 30 MIN: CPT | Performed by: NURSE PRACTITIONER

## 2025-02-12 RX ORDER — LEVOTHYROXINE SODIUM 0.05 MG/1
TAKE 1 TABLET DAILY TABLET ORAL
Refills: 0 | Status: ON HOLD | COMMUNITY
Start: 2016-02-16

## 2025-02-12 RX ORDER — IBUPROFEN 800 MG/1
TABLET ORAL
Qty: 30 | Refills: 0 | Status: ON HOLD | COMMUNITY
Start: 2016-04-04

## 2025-02-12 RX ORDER — LINACLOTIDE 290 UG/1
1 CAPSULE AT LEAST 30 MINUTES BEFORE THE FIRST MEAL OF THE DAY ON AN EMPTY STOMACH CAPSULE, GELATIN COATED ORAL ONCE A DAY
Status: ACTIVE | COMMUNITY
Start: 2024-07-08

## 2025-02-12 RX ORDER — DIAZEPAM 5 MG/1
TABLET ORAL
Qty: 30 | Refills: 0 | Status: ON HOLD | COMMUNITY
Start: 2016-01-14

## 2025-02-12 RX ORDER — LEVOFLOXACIN 500 MG/1
TABLET, FILM COATED ORAL
Qty: 10 | Refills: 0 | Status: ON HOLD | COMMUNITY
Start: 2014-01-16

## 2025-02-12 RX ORDER — LISINOPRIL 5 MG/1
1 TABLET TABLET ORAL ONCE A DAY
Status: ON HOLD | COMMUNITY

## 2025-02-12 RX ORDER — AZITHROMYCIN DIHYDRATE 250 MG/1
TABLET, FILM COATED ORAL
Qty: 6 | Refills: 0 | Status: ON HOLD | COMMUNITY
Start: 2014-04-11

## 2025-02-12 RX ORDER — VALACYCLOVIR 1 G/1
TAKE 1 TABLET DAILY TABLET, FILM COATED ORAL
Refills: 0 | Status: ON HOLD | COMMUNITY
Start: 2016-04-12

## 2025-02-12 RX ORDER — GLATIRAMER ACETATE 20 MG/ML
INJECT 20 MG DAILY INJECTION, SOLUTION SUBCUTANEOUS
Refills: 0 | Status: ON HOLD | COMMUNITY
Start: 2016-03-16

## 2025-02-12 RX ORDER — MODAFINIL 200 MG/1
TAKE 1 TABLET DAILY TABLET ORAL
Refills: 0 | Status: ON HOLD | COMMUNITY
Start: 2016-02-15

## 2025-02-12 RX ORDER — DEXLANSOPRAZOLE 60 MG/1
TAKE 1 CAPSULE BY MOUTH EVERY DAY CAPSULE, DELAYED RELEASE ORAL
Qty: 90 | Refills: 3 | Status: ON HOLD | COMMUNITY
Start: 2016-04-12

## 2025-02-12 RX ORDER — AMLODIPINE BESYLATE 5 MG/1
1 TABLET TABLET ORAL ONCE A DAY
Status: ACTIVE | COMMUNITY

## 2025-02-12 RX ORDER — AMOXICILLIN 500 MG/1
TAKE 2 CAPSULES TWICE DAILY CAPSULE ORAL
Qty: 56 | Refills: 0 | Status: ON HOLD | COMMUNITY
Start: 2016-04-19

## 2025-02-12 RX ORDER — LEVOTHYROXINE SODIUM 25 UG/1
TABLET ORAL
Qty: 30 | Refills: 0 | Status: ON HOLD | COMMUNITY
Start: 2016-06-30

## 2025-02-12 RX ORDER — LORATADINE 5 MG/5 ML
TAKE 1 TABLET DAILY AS DIRECTED SOLUTION, ORAL ORAL
Refills: 0 | Status: ON HOLD | COMMUNITY

## 2025-02-12 RX ORDER — ATORVASTATIN CALCIUM 40 MG/1
TABLET, FILM COATED ORAL
Qty: 30 | Refills: 0 | Status: ON HOLD | COMMUNITY
Start: 2016-09-01

## 2025-02-12 RX ORDER — LINACLOTIDE 145 UG/1
TAKE 1 CAPSULE BY MOUTH DAILY CAPSULE, GELATIN COATED ORAL
Qty: 90 | Refills: 3 | Status: ON HOLD | COMMUNITY
Start: 2016-07-12

## 2025-02-12 RX ORDER — METOCLOPRAMIDE 5 MG/1
TAKE 1 TABLET 3 TIMES DAILY PRN AS NEEDED TABLET ORAL
Qty: 60 | Refills: 1 | Status: ON HOLD | COMMUNITY
Start: 2016-05-26

## 2025-02-12 RX ORDER — CLARITHROMYCIN 500 MG/1
TAKE 1 TABLET TWICE DAILY TABLET, FILM COATED ORAL
Qty: 10 | Refills: 0 | Status: ON HOLD | COMMUNITY
Start: 2016-04-19

## 2025-02-12 RX ORDER — SERTRALINE 50 MG/1
TAKE 1 TABLET DAILY TABLET, FILM COATED ORAL
Qty: 30 | Refills: 1 | Status: ON HOLD | COMMUNITY
Start: 2016-04-18

## 2025-02-12 RX ORDER — NITROFURANTOIN MONOHYDRATE/MACROCRYSTALLINE 25; 75 MG/1; MG/1
CAPSULE ORAL
Qty: 14 | Refills: 0 | Status: ON HOLD | COMMUNITY
Start: 2016-09-15

## 2025-02-12 RX ORDER — FLUCONAZOLE 100 MG/1
1 TABLET TABLET ORAL
Status: ON HOLD | COMMUNITY

## 2025-02-12 RX ORDER — OMEPRAZOLE 20 MG/1
1 TABLET 30 MINUTES BEFORE MORNING MEAL TABLET, DELAYED RELEASE ORAL ONCE A DAY
Status: ACTIVE | COMMUNITY

## 2025-02-12 NOTE — HPI-TODAY'S VISIT:
55 yo woman with abdominal bloating adn constipation, history of liver lesion (possible atypical hemangiomas),  presenting for follow up after MRI abdomen.  MRI abdomen w/wo contrast 1/13/25 notbale for a stable 2 cm right hepatic lobe lesion most consistent with an atypical hemangioma, stable 6 mm right hepatic lesion possible small hemngioma. No new lesions identified. No follow up imaging recommended.  She did not get the MRI defecography completed. She would like the order resent to [facility]. Bloating and constipation are unchanged. She thinks that she has a lot of bacteria coming out of her when she has bowel movements. She also has a bad case of BV. She is seeing a functional medicine doctor. She completed the Xifaxan without any improvement. She is dairy free, gluten free and processed food free for the last month. She continues to have increased bloating but not weight gain. She is eating whole foods. She is considering trying to the low FODMAP diet.

## 2025-02-17 ENCOUNTER — TELEPHONE ENCOUNTER (OUTPATIENT)
Dept: URBAN - METROPOLITAN AREA CLINIC 23 | Facility: CLINIC | Age: 55
End: 2025-02-17

## 2025-03-21 ENCOUNTER — TELEPHONE ENCOUNTER (OUTPATIENT)
Dept: URBAN - METROPOLITAN AREA CLINIC 113 | Facility: CLINIC | Age: 55
End: 2025-03-21

## 2025-05-14 ENCOUNTER — OFFICE VISIT (OUTPATIENT)
Dept: URBAN - METROPOLITAN AREA CLINIC 113 | Facility: CLINIC | Age: 55
End: 2025-05-14